# Patient Record
Sex: MALE | Race: WHITE | NOT HISPANIC OR LATINO
[De-identification: names, ages, dates, MRNs, and addresses within clinical notes are randomized per-mention and may not be internally consistent; named-entity substitution may affect disease eponyms.]

---

## 2019-03-22 PROBLEM — Z00.00 ENCOUNTER FOR PREVENTIVE HEALTH EXAMINATION: Status: ACTIVE | Noted: 2019-03-22

## 2019-03-26 ENCOUNTER — NON-APPOINTMENT (OUTPATIENT)
Age: 59
End: 2019-03-26

## 2019-03-26 ENCOUNTER — APPOINTMENT (OUTPATIENT)
Dept: CARDIOLOGY | Facility: CLINIC | Age: 59
End: 2019-03-26
Payer: COMMERCIAL

## 2019-03-26 VITALS
SYSTOLIC BLOOD PRESSURE: 130 MMHG | OXYGEN SATURATION: 96 % | WEIGHT: 220 LBS | BODY MASS INDEX: 29.16 KG/M2 | HEART RATE: 60 BPM | HEIGHT: 73 IN | DIASTOLIC BLOOD PRESSURE: 84 MMHG

## 2019-03-26 DIAGNOSIS — Z78.9 OTHER SPECIFIED HEALTH STATUS: ICD-10-CM

## 2019-03-26 DIAGNOSIS — Z83.3 FAMILY HISTORY OF DIABETES MELLITUS: ICD-10-CM

## 2019-03-26 DIAGNOSIS — Z86.79 PERSONAL HISTORY OF OTHER DISEASES OF THE CIRCULATORY SYSTEM: ICD-10-CM

## 2019-03-26 PROCEDURE — 99243 OFF/OP CNSLTJ NEW/EST LOW 30: CPT

## 2019-03-26 NOTE — DISCUSSION/SUMMARY
[FreeTextEntry1] : Jayme is a 58-year-old male with medical history detailed above and active medical issues including:\par \par - Dyspnea on exertion, multiple CAD risk factors. Patient will have noninvasive testing with a exercise Myoview stress test to assess for obstructive CAD, exercise-induced arrhythmia,  blood pressure response, 2DEcho for LVEF, wall motion, structural heart disease,  carotid and abdominal ultrasound to assess for obstructive PAD. \par \par - Borderline hypertension.  Intolerant to low-dose lisinopril. Check home blood pressures to confirm average BP at goal <130/80\par \par - Dyslipidemia, patient does not wish to start statin therapy at this time.  Patient will modifiy diet and repeat labs in 3 months.\par \par Patient will be seen in cardiology follow-up after noninvasive testing.\par \par Advised patient to follow active lifestyle with regular cardiovascular exercise. Patient educated on lifestyle and diet modification with low sodium low fat diet and avoidance of excessive alcohol. Patient is aware to call with any symptoms or concerns. \par \par Jayme will followup with Dr Minerva Dey for primary care. \par

## 2019-03-26 NOTE — REASON FOR VISIT
[Consultation] : a consultation regarding [FreeTextEntry2] : borderline hypertension, dyslipidemia [FreeTextEntry1] : Jayme is a 58-year-old male with borderline hypertension, dyslipidemia.\par \par Patient has dyspnea with moderate exertion. Cardiovascular review of symptoms is negative for exertional chest pain, palpitations, dizziness or syncope.  No PND or orthopnea leg edema.  No bleeding or black stool.\par \par Patient is exercising on an exercise bike 30 minutes with exertional chest pain.  Patient has dyspnea climbing stairs. 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  Patient states he noticed elevated blood pressure during the day.  Patient was started on lisinopril in October and had fatigue shoulder and hip pain.  Lisinopril was changed to amlodipine one week ago and patient is not yet started amlodipine.  Patient instructed to check home blood pressures daily for one week and a calm relaxed environment.  Home BP cuff will be calibrated next office visit. \par \par No prior cardiology evaluation or stress test. \par \par Labs October 2018 normal BMP, fasting cholesterol 250, HDL 49, , triglyceride 148.

## 2019-04-26 ENCOUNTER — APPOINTMENT (OUTPATIENT)
Dept: CARDIOLOGY | Facility: CLINIC | Age: 59
End: 2019-04-26
Payer: COMMERCIAL

## 2019-04-26 PROCEDURE — 93979 VASCULAR STUDY: CPT

## 2019-04-26 PROCEDURE — 93880 EXTRACRANIAL BILAT STUDY: CPT

## 2019-04-26 PROCEDURE — 93306 TTE W/DOPPLER COMPLETE: CPT

## 2019-05-10 ENCOUNTER — APPOINTMENT (OUTPATIENT)
Dept: CARDIOLOGY | Facility: CLINIC | Age: 59
End: 2019-05-10
Payer: COMMERCIAL

## 2019-05-10 PROCEDURE — A9502: CPT

## 2019-05-10 PROCEDURE — 93015 CV STRESS TEST SUPVJ I&R: CPT

## 2019-05-10 PROCEDURE — 78452 HT MUSCLE IMAGE SPECT MULT: CPT

## 2019-05-24 ENCOUNTER — APPOINTMENT (OUTPATIENT)
Dept: CARDIOLOGY | Facility: CLINIC | Age: 59
End: 2019-05-24
Payer: COMMERCIAL

## 2019-05-24 VITALS
HEART RATE: 68 BPM | WEIGHT: 220 LBS | OXYGEN SATURATION: 98 % | BODY MASS INDEX: 29.16 KG/M2 | SYSTOLIC BLOOD PRESSURE: 146 MMHG | HEIGHT: 73 IN | DIASTOLIC BLOOD PRESSURE: 82 MMHG

## 2019-05-24 DIAGNOSIS — R06.09 OTHER FORMS OF DYSPNEA: ICD-10-CM

## 2019-05-24 DIAGNOSIS — I10 ESSENTIAL (PRIMARY) HYPERTENSION: ICD-10-CM

## 2019-05-24 DIAGNOSIS — E78.5 HYPERLIPIDEMIA, UNSPECIFIED: ICD-10-CM

## 2019-05-24 DIAGNOSIS — R00.2 PALPITATIONS: ICD-10-CM

## 2019-05-24 PROCEDURE — 99214 OFFICE O/P EST MOD 30 MIN: CPT

## 2019-05-24 NOTE — DISCUSSION/SUMMARY
[FreeTextEntry1] : Jayme is a 58-year-old male with medical history detailed above and active medical issues including:\par \par - No anginal symptoms, normal perfusion Myoview stress test, normal LVEF by echo.  Coronary calcium score has been ordered for risk stratification. \par \par - Borderline hypertension.  Intolerant to low-dose lisinopril. Average home blood pressure at BP goal <130/80\par \par - Dyslipidemia, patient does not wish to start statin therapy at this time.  Patient will modifiy diet and repeat labs in 3 months.\par \par Patient will be seen in cardiology follow-up one year. \par \par Advised patient to follow active lifestyle with regular cardiovascular exercise. Patient educated on lifestyle and diet modification with low sodium low fat diet and avoidance of excessive alcohol. Patient is aware to call with any symptoms or concerns. \par \par Jayme will followup with Dr Minerva Dey for primary care. \par

## 2019-05-24 NOTE — REASON FOR VISIT
[Consultation] : a consultation regarding [FreeTextEntry2] : borderline hypertension, dyslipidemia [FreeTextEntry1] : Jayme is a 58-year-old male with borderline hypertension, dyslipidemia.\par \par Patient has dyspnea with moderate exertion. Cardiovascular review of symptoms is negative for exertional chest pain, palpitations, dizziness or syncope.  No PND or orthopnea leg edema.  No bleeding or black stool.\par \par Patient is exercising on an exercise bike 30 minutes with exertional chest pain.  Patient has dyspnea climbing stairs. 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  Patient states he noticed elevated blood pressure during the day.  Patient was started on lisinopril in October and had fatigue shoulder and hip pain.  Lisinopril was changed to amlodipine one week ago and patient is not yet started amlodipine. Home blood pressures are averaging 130/80, patient currently not on antihypertensive medication. \par \par Exercise Myoview stress test May 2019 LVEF 49%, normal LV EF by echo, normal perfusion with inferior attenuation artifact, equivocal EKG response, no chest pain, 93% MPHR, 10 minutes 30 seconds Kp protocol.  Baseline sinus rhythm, LAE\par \par Echocardiogram April 2019, LVEF 62%, mitral valve prolapse anterior leaflet, trace MR, normal RVSP\par \par Carotid and abdominal ultrasound May 2019, mild nonobstructive plaque, normal abdominal aortic size\par \par Labs October 2018 normal BMP, fasting cholesterol 250, HDL 49, , triglyceride 148.

## 2019-05-24 NOTE — PHYSICAL EXAM
[General Appearance - Well Developed] : well developed [Normal Appearance] : normal appearance [Well Groomed] : well groomed [General Appearance - Well Nourished] : well nourished [No Deformities] : no deformities [Normal Conjunctiva] : the conjunctiva exhibited no abnormalities [General Appearance - In No Acute Distress] : no acute distress [Eyelids - No Xanthelasma] : the eyelids demonstrated no xanthelasmas [Normal Oral Mucosa] : normal oral mucosa [No Oral Pallor] : no oral pallor [Normal Jugular Venous A Waves Present] : normal jugular venous A waves present [No Oral Cyanosis] : no oral cyanosis [Normal Jugular Venous V Waves Present] : normal jugular venous V waves present [No Jugular Venous Jones A Waves] : no jugular venous jones A waves [Respiration, Rhythm And Depth] : normal respiratory rhythm and effort [Auscultation Breath Sounds / Voice Sounds] : lungs were clear to auscultation bilaterally [Exaggerated Use Of Accessory Muscles For Inspiration] : no accessory muscle use [Heart Sounds] : normal S1 and S2 [Heart Rate And Rhythm] : heart rate and rhythm were normal [Abdomen Soft] : soft [Murmurs] : no murmurs present [Abdomen Tenderness] : non-tender [Abdomen Mass (___ Cm)] : no abdominal mass palpated [Gait - Sufficient For Exercise Testing] : the gait was sufficient for exercise testing [Abnormal Walk] : normal gait [Nail Clubbing] : no clubbing of the fingernails [Cyanosis, Localized] : no localized cyanosis [Petechial Hemorrhages (___cm)] : no petechial hemorrhages [No Venous Stasis] : no venous stasis [] : no rash [Skin Color & Pigmentation] : normal skin color and pigmentation [No Xanthoma] : no  xanthoma was observed [Skin Lesions] : no skin lesions [No Skin Ulcers] : no skin ulcer [Oriented To Time, Place, And Person] : oriented to person, place, and time [Affect] : the affect was normal [Mood] : the mood was normal [No Anxiety] : not feeling anxious

## 2022-11-17 ENCOUNTER — NON-APPOINTMENT (OUTPATIENT)
Age: 62
End: 2022-11-17

## 2024-02-12 ENCOUNTER — APPOINTMENT (OUTPATIENT)
Dept: ORTHOPEDIC SURGERY | Facility: CLINIC | Age: 64
End: 2024-02-12
Payer: COMMERCIAL

## 2024-02-12 VITALS — BODY MASS INDEX: 29.82 KG/M2 | HEIGHT: 73 IN | WEIGHT: 225 LBS

## 2024-02-12 PROCEDURE — 99204 OFFICE O/P NEW MOD 45 MIN: CPT

## 2024-02-13 NOTE — HISTORY OF PRESENT ILLNESS
[de-identified] : Age: 63 year M PMHx: HTN, HLD, Wrist surgery in 2009 Hand Dominance: RHD Chief Complaint: Right wrist pain onset 02/12/24. Patient reports that he was playing basketball today when he went to steal the ball, he collided with the ball and injured his hand. Patient went to [facility] 02/12/24 and had radiographs performed that confirmed the fracture. Denies numbness/tingling. Trauma: yes Outside Imaging/Treatment: [facility] 02/12/24 OTC Medications: Motrin PRN with relief OT/PT: none Bracing: hand currently wrapped Pain worse with: exertion Pain better with: rest, Motrin

## 2024-02-13 NOTE — IMAGING
[de-identified] : Right hand with dorsal swelling. Skin intact. +ttp at 3rd metacarpal, -ecchymosis. Able to make gentle fist with no rotational deformity. Sensation intact throughout. <2sec cap refill.  Right hand radiographs with 3rd metacarpal fractures, nondisplaced.

## 2024-02-13 NOTE — ASSESSMENT
[FreeTextEntry1] : Right 3rd metacarpal shaft fracture, non displaced - will manage with closed management  Reviewed radiographs with patient. Discussed radiograph alignment is within acceptable parameters for closed management. NWB, Elevate. Discussed risk of pain, stiffness and displacement requiring further intervention. Also discussed risk of post-traumatic arthritis. Script for intrinsic splint provided.  F/u 3weeks; repeat films; advance ROM

## 2024-03-11 ENCOUNTER — APPOINTMENT (OUTPATIENT)
Dept: ORTHOPEDIC SURGERY | Facility: CLINIC | Age: 64
End: 2024-03-11
Payer: COMMERCIAL

## 2024-03-11 VITALS — BODY MASS INDEX: 29.82 KG/M2 | HEIGHT: 73 IN | WEIGHT: 225 LBS

## 2024-03-11 PROCEDURE — 99213 OFFICE O/P EST LOW 20 MIN: CPT

## 2024-03-11 PROCEDURE — 73130 X-RAY EXAM OF HAND: CPT | Mod: RT

## 2024-04-08 ENCOUNTER — APPOINTMENT (OUTPATIENT)
Dept: ORTHOPEDIC SURGERY | Facility: CLINIC | Age: 64
End: 2024-04-08
Payer: COMMERCIAL

## 2024-04-08 VITALS — BODY MASS INDEX: 29.82 KG/M2 | WEIGHT: 225 LBS | HEIGHT: 73 IN

## 2024-04-08 DIAGNOSIS — S69.90XA UNSPECIFIED INJURY OF UNSPECIFIED WRIST, HAND AND FINGER(S), INITIAL ENCOUNTER: ICD-10-CM

## 2024-04-08 PROCEDURE — 99213 OFFICE O/P EST LOW 20 MIN: CPT

## 2024-04-08 PROCEDURE — 73130 X-RAY EXAM OF HAND: CPT | Mod: RT

## 2024-04-08 NOTE — HISTORY OF PRESENT ILLNESS
[de-identified] : Age: 63 year M PMHx: HTN, HLD, Wrist surgery in 2009 Hand Dominance: RHD Chief Complaint: Right hand pain onset 02/12/24. Patient reports that he was playing basketball today when he went to steal the ball, he collided with the ball and injured his hand. Patient went to [facility] 02/12/24 and had radiographs performed that confirmed the fracture. Denies numbness/tingling. Trauma: yes Outside Imaging/Treatment: [facility] 02/12/24 OTC Medications: Motrin PRN with relief OT/PT: none Bracing: hand currently wrapped Pain worse with: exertion Pain better with: rest, Motrin  03/11/24: f/u right hand. Patient reports that he feels like his hand is still painful but reports no pain in his hand when he wears the splint. Patient presents in splint and reports that he is compliant with it. Denies numbness/tingling.   04/08/24: f/u right hand. Patient reports that he is doing well and reports minimal discomfort. Patient states some swelling on the top of the right hand, but is doing well with his ROM. Patient reports that he has been doing some light weightlifting without any difficulties. Denies numbness/tingling.

## 2024-04-08 NOTE — ASSESSMENT
[FreeTextEntry1] : Right 3rd metacarpal shaft fracture, non displaced - will continue to manage with closed management  Reviewed radiographs with patient. Discussed radiograph alignment is within acceptable parameters for closed management. WBAT, Elevate. Discussed risk of pain, stiffness and displacement requiring further intervention. Also discussed risk of post-traumatic arthritis. Ease out of brace, OT for motion.  F/u 8weeks/prn; repeat films; advance ROM (phys )

## 2024-04-08 NOTE — IMAGING
[de-identified] : Right hand with resolved dorsal swelling, +callus bump. Skin intact. -ttp at 3rd metacarpal, -ecchymosis. Able to make full fist with no rotational deformity. Sensation intact throughout. <2sec cap refill.  Right hand radiographs with 3rd metacarpal fractures, nondisplaced. +callus, alignment maintained. (3-view)

## 2024-04-12 ENCOUNTER — APPOINTMENT (OUTPATIENT)
Dept: ORTHOPEDIC SURGERY | Facility: CLINIC | Age: 64
End: 2024-04-12

## 2024-06-03 ENCOUNTER — APPOINTMENT (OUTPATIENT)
Dept: ORTHOPEDIC SURGERY | Facility: CLINIC | Age: 64
End: 2024-06-03
Payer: COMMERCIAL

## 2024-06-03 VITALS — BODY MASS INDEX: 29.16 KG/M2 | WEIGHT: 220 LBS | HEIGHT: 73 IN

## 2024-06-03 DIAGNOSIS — M75.112 INCOMPLETE ROTATOR CUFF TEAR OR RUPTURE OF LEFT SHOULDER, NOT SPECIFIED AS TRAUMATIC: ICD-10-CM

## 2024-06-03 DIAGNOSIS — S43.432A SUPERIOR GLENOID LABRUM LESION OF LEFT SHOULDER, INITIAL ENCOUNTER: ICD-10-CM

## 2024-06-03 DIAGNOSIS — M19.012 PRIMARY OSTEOARTHRITIS, LEFT SHOULDER: ICD-10-CM

## 2024-06-03 PROCEDURE — 20605 DRAIN/INJ JOINT/BURSA W/O US: CPT | Mod: LT

## 2024-06-03 PROCEDURE — 99214 OFFICE O/P EST MOD 30 MIN: CPT | Mod: 25

## 2024-06-03 NOTE — DISCUSSION/SUMMARY
[de-identified] : History, clinical examination and imaging were most consistent with: -left shoulder AC joint arthritis, low grade rotator cuff tear, SLAP tear   The diagnosis was explained in detail. The potential non-surgical and surgical treatments were reviewed. The relative risks and benefits of each option were considered relative to the patients age, activity level, medical history, symptom severity and previously attempted treatments.   The patient was advised to consult with their primary medical provider prior to initiation of any new medications to reduce the risk of adverse effects specific to their long-term home medications and medical history. The risk of gastrointestinal irritation and kidney injury specific to long-term NSAID use was discussed.   -Discussed that his current pain appears to be from his AC joint.  -Patient will proceed with formal PT. -Cortisone injection performed today for symptom relief. -Over the counter NSAID as needed for pain. -Follow up in 6 weeks. If symptoms persist consider repeat CSI vs surgical options.  (MDM)   Problem Complexity -Moderate: chronic illness with exacerbation   Risk -Moderate: injection   (Procedure: Corticosteroid Injection)   Injection location was the: -left acromioclavicular joint   Injection contents were: 40 mg of kenalog and 5 mL of 1% lidocaine   Procedure Details: -Informed consent was obtained. Discussed possible risks of corticosteroid injection including hyperglycemia, infection and skin discoloration. -Discussed that due to infection risk, an interval between injection and any future surgery is 6 weeks for arthroscopy and 3 months for arthroplasty. -Injection performed using aseptic technique. Hemostasis was confirmed. -Procedure was tolerated well with no complications.

## 2024-06-03 NOTE — IMAGING
[de-identified] : (Exam: Shoulder)   Laterality is left   Patient is in no acute distress, alert and oriented Sensation is grossly intact to light touch in the hand Motor function is 5/5 in the hand Capillary refill is less than 2 seconds in the fingers Lymphadenopathy is not present Peripheral edema is not present   Skin is intact Swelling is not present Atrophy is not present Scapular winging is not present Deformity of the AC joint is not present Deformity of the biceps is not present   Bicipital groove tenderness is present AC joint tenderness is present Scapulothoracic tenderness is not present   Active forward elevation is 160 Passive forward elevation is 175 External rotation at the side is 60 Internal rotation behind the back is to the level of T12   Forward elevation strength is 5-/5 External rotation strength at the side is 5/5 Internal rotation strength at the side is 5/5 Deltoid strength of anterior, posterior and lateral heads is 5/5   Beltran test is abnormal OBriens test is abnormal Empty can test is abnormal Speeds test is abnormal Cross body adduction test is abnormal Belly press test is normal Apprehension and relocation is normal Sulcus sign is normal

## 2024-06-03 NOTE — HISTORY OF PRESENT ILLNESS
[de-identified] : Date of initial evaluation is 06/03/2024 Patient age is 63 year  Occupation is retired  Body part causing symptoms is the left shoulder Symptoms began 3 months ago, working out at the gym and felt a pain in the shoulder Location of pain is anterior and superior Quality of pain is dull  Pain score at rest is 0 Pain score during activity is 2 Radicular symptoms are not present Prior treatments include none  Patient's condition is not associated with workers compensation, no-fault or interscholastic athletics

## 2024-06-03 NOTE — DATA REVIEWED
[MRI] : MRI [Left] : left [Shoulder] : shoulder [I independently reviewed and interpreted images and report] : I independently reviewed and interpreted images and report [FreeTextEntry1] : low grade partial supraspinatus tear, SLAP tear, AC joint arthiritis with edema MILD

## 2024-07-15 ENCOUNTER — APPOINTMENT (OUTPATIENT)
Dept: ORTHOPEDIC SURGERY | Facility: CLINIC | Age: 64
End: 2024-07-15

## 2025-07-25 ENCOUNTER — APPOINTMENT (OUTPATIENT)
Dept: ORTHOPEDIC SURGERY | Facility: CLINIC | Age: 65
End: 2025-07-25
Payer: COMMERCIAL

## 2025-07-25 DIAGNOSIS — S43.432A SUPERIOR GLENOID LABRUM LESION OF LEFT SHOULDER, INITIAL ENCOUNTER: ICD-10-CM

## 2025-07-25 DIAGNOSIS — M19.012 PRIMARY OSTEOARTHRITIS, LEFT SHOULDER: ICD-10-CM

## 2025-07-25 DIAGNOSIS — M75.112 INCOMPLETE ROTATOR CUFF TEAR OR RUPTURE OF LEFT SHOULDER, NOT SPECIFIED AS TRAUMATIC: ICD-10-CM

## 2025-07-25 PROCEDURE — 73030 X-RAY EXAM OF SHOULDER: CPT | Mod: LT

## 2025-07-25 PROCEDURE — 99214 OFFICE O/P EST MOD 30 MIN: CPT | Mod: 25

## 2025-07-25 PROCEDURE — 20605 DRAIN/INJ JOINT/BURSA W/O US: CPT | Mod: LT

## 2025-07-29 ENCOUNTER — NON-APPOINTMENT (OUTPATIENT)
Age: 65
End: 2025-07-29